# Patient Record
(demographics unavailable — no encounter records)

---

## 2025-01-02 NOTE — ASSESSMENT
[FreeTextEntry1] : Exam. Mycotic toe nails each debrided with manual cutters and electrical  to patient tolerance. Instructed patient to apply Tolcylen to the affected toe nails daily. Instructed Deyvi to dry his toe nails well, change wet or damp socks and shoes to dry ones. Patient demonstrated verbal understanding of all instructions. Patient to return in one month.

## 2025-01-02 NOTE — HISTORY OF PRESENT ILLNESS
[FreeTextEntry1] : 68 year old male patient returns to office for follow up care of fungal toe nails. he states he has been applying Tolcylen twice a day almost everyday as instructed and he continues to see improvement. He states he realizes he is behind on following up for his appointments ,he had a death in the family recently that has caused him to have to delay.

## 2025-01-02 NOTE — PHYSICAL EXAM
[General Appearance - Alert] : alert [General Appearance - In No Acute Distress] : in no acute distress [General Appearance - Well Nourished] : well nourished [Sensation] : the sensory exam was normal to light touch and pinprick [de-identified] : No structural deformities b/l, with ROM WNL b/l with no tenderness with ROM b/l. [FreeTextEntry1] : 3 mycotic toe nails to right 4th toenail, left 1st toenail, and left 5th toenail noted, brittle, discolored, thickened and incurvated, each with clinical improvement noted with proximal clearing noted. Mycotic toe nail pain: In shoe gear and on palpation.  Skin intact with no open lesions to b/l feet, no clinical signs of bacterial infection.

## 2025-02-25 NOTE — HISTORY OF PRESENT ILLNESS
[FreeTextEntry1] : 68 year old male patient returns today for painful big toe nail. He states the nail feels like it is putting a lot of pressure on the skin, on his right big toe nail. He states he has been applying Tolcylen as instructed, and he sees improvement.

## 2025-02-25 NOTE — ASSESSMENT
[FreeTextEntry1] : Exam. Aseptic preparation of the hallux with betadine. Slant back excision of the affected ingrown nail border, right hallux medial nail border, performed with sterile instrumentation, no drainage expressed and no clinical signs of bacterial infection. Instructed patient to soak area in warm water for 10 min prn. Instructed patient to call our office if any signs or symptoms of infection arise. .Mycotic toe nails each debrided with manual cutters to patient tolerance. Instructed patient to dry his toe nails well, change wet or damp socks and shoes to dry ones. Instructed patient to continue applying Tolcylen daily. Patient demonstrated verbal understanding of all instructions. Patient to follow up in one month.

## 2025-02-25 NOTE — PHYSICAL EXAM
[General Appearance - Alert] : alert [General Appearance - In No Acute Distress] : in no acute distress [General Appearance - Well Nourished] : well nourished [Sensation] : the sensory exam was normal to light touch and pinprick [de-identified] : No structural deformities to feet b/l, with ROM WNL b/l  no tenderness with ROM b/l. [FreeTextEntry1] : Right first toe nail with mild pain on palpation or medial border, with mild localized erythema, no increase in warmth. o drainage, and pain relief noted upon removal of ingrown nail border. 3 mycotic toe nails to right 4th toenail, left 1st toenail, and left 5th toenail each noted to be brittle, discolored, thickened and incurvated, with clinical improvement, with increased proximal clearing noted. Mycotic toe nail pain: In shoe gear and on palpation. No open lesions b/l feet. No clinical signs of bacterial infection b/l.

## 2025-04-28 NOTE — PHYSICAL EXAM
[General Appearance - Alert] : alert [General Appearance - In No Acute Distress] : in no acute distress [General Appearance - Well Nourished] : well nourished [Sensation] : the sensory exam was normal to light touch and pinprick [de-identified] : Fett b/l with no structural deformities, with ROM WNL b/l and with no tenderness with ROM noted. [FreeTextEntry1] : Mycotic toe nails to right 4th toenail, left 1st toenail, and left 5th toenail, each brittle, discolored, thickened and incurvated, each with clinical improvement, and increased proximal clearing noted. Mycotic toe nail pain: In shoe gear and on palpation. No open lesions b/l feet, with no clinical signs of bacterial infection b/l.

## 2025-04-28 NOTE — HISTORY OF PRESENT ILLNESS
[FreeTextEntry1] : This 68 year old male patient returns for continued care of his painful, fungal toe nails. He states the has noticed a lot of improvement since his last visit, he is overdue to follow up because he has been away but he's continued to apply Tolcyeln. He states that he has been applying Tolcylen as instructed, and he sees improvement.

## 2025-04-28 NOTE — ASSESSMENT
[FreeTextEntry1] : Examination. Mycotic toe nails were debrided using manual cutters to patient tolerance. Instructed Deyvi  to continue to apply Tolcylen daily, reviewed instructions for use. Instructed patient to dry his toe nails well, change wet or damp socks and shoes to dry ones.  Patient demonstrated verbal understanding of all instructions. Patient to follow up in one month.
Yes

## 2025-05-27 NOTE — PHYSICAL EXAM
[General Appearance - Alert] : alert [General Appearance - In No Acute Distress] : in no acute distress [General Appearance - Well Nourished] : well nourished [Sensation] : the sensory exam was normal to light touch and pinprick [de-identified] : No structural deformities, with ROM WNL b/l feet. No tenderness with ROM noted. [FreeTextEntry1] : Mycotic toe nails to right 4th toenail, left 1st toenail, and left 5th toenail, brittle, discolored, thickened and incurvated, with clinical improvement noted with  increased proximal clearing present. Mycotic toe nail pain: In shoe gear and on palpation. No open lesions b/l feet. No clinical signs of bacterial infection b/l to b/l feet.

## 2025-05-27 NOTE — HISTORY OF PRESENT ILLNESS
[FreeTextEntry1] : 68 year old male patient returns to office for care of his painful, fungal toe nails. Fausto states there is improvement for the nails he thi ks and he has been applying the Tolcylen to the toe nails almost everyday.

## 2025-05-27 NOTE — ASSESSMENT
[FreeTextEntry1] : Exam. Performed mycotic toe nails were debrided using manual cutters to patient tolerance. Instructed patient to apply Tolcylen daily. Instructed the patient to dry his toe nails well, change wet or damp socks and shoes to dry ones.  Deyvi demonstrated verbal understanding of all instructions. Return in 1 month.

## 2025-06-26 NOTE — ASSESSMENT
[FreeTextEntry1] : Examination. Mycotic toe nails debrided using manual cutters to patient tolerance. Instructed the patient to dry his toe nails well, change wet or damp socks and shoes to dry ones. Instructed the patient to apply Tolcylen daily, reviewed instructions for application with the patient. Patient demonstrated verbal understanding of all instructions. Follow up in 1 month.

## 2025-06-26 NOTE — PHYSICAL EXAM
[General Appearance - Alert] : alert [General Appearance - In No Acute Distress] : in no acute distress [General Appearance - Well Nourished] : well nourished [Sensation] : the sensory exam was normal to light touch and pinprick [de-identified] : No pain on palpation of feet b/l, no pain on ROM of foot and ankle b/l, no structural deformities noted b/l. [FreeTextEntry1] : Right 4th toenail, left 1st toenail, and left 5th toenails mycotic, brittle, discolored, thickened and incurvated, with clinical improvement noted with increased proximal clearing noted compared to previous visit. Mycotic toe nail pain: In shoe gear and on palpation. No open lesions, no clinical signs of bacterial infection to b/l feet and anlkes.

## 2025-06-26 NOTE — HISTORY OF PRESENT ILLNESS
[FreeTextEntry1] : This 68 year old male patient returns for care of his painful, fungal toe nails. Patient states it is hard for him to see if there is improvement to all the fungal toe nails, he thinks they are improving. He states he applies the Tolcylen to the toe nails almost daily.

## 2025-07-23 NOTE — ASSESSMENT
[FreeTextEntry1] : Exam. Patient's mycotic toe nails  were debrided using manual cutters to patient tolerance. Instructed the patient to continue to apply Tolcylen daily, reviewed instructions for application with patient. Instructed patient to dry his toe nails well, change wet or damp socks and shoes to dry ones. Patient demonstrated verbal understanding of all instructions. PTR in 1 month.

## 2025-07-23 NOTE — PHYSICAL EXAM
[General Appearance - Alert] : alert [General Appearance - In No Acute Distress] : in no acute distress [General Appearance - Well Nourished] : well nourished [Sensation] : the sensory exam was normal to light touch and pinprick [de-identified] : No pain on palpation or on ROM of feet and ankles b/l, with no structural deformities noted b/l. [FreeTextEntry1] : Mycotic, brittle, discolored, thickened and incurvated toe nails- right 4th toenail, left 1st toenail, and left 5th toenails each with clinical improvement, proximal clearing noted. Mycotic toe nail pain: In shoe gear and on palpation. No open lesions, no clinical signs of bacterial infection to feet and ankles b/l.

## 2025-07-23 NOTE — HISTORY OF PRESENT ILLNESS
[FreeTextEntry1] : 68 year old male patient returns to office for follow up care of his painful, fungal toe nails. The patient states he has been applying the Tolcylen to the toe nails daily lately. He states it is hard to see the toe nails, but he thinks he sees improvement.